# Patient Record
Sex: FEMALE
[De-identification: names, ages, dates, MRNs, and addresses within clinical notes are randomized per-mention and may not be internally consistent; named-entity substitution may affect disease eponyms.]

---
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
## 2021-12-13 ENCOUNTER — HOSPITAL ENCOUNTER (EMERGENCY)
Dept: HOSPITAL 56 - MW.ED | Age: 21
Discharge: HOME | End: 2021-12-13
Payer: COMMERCIAL

## 2021-12-13 VITALS — HEART RATE: 77 BPM | DIASTOLIC BLOOD PRESSURE: 60 MMHG | SYSTOLIC BLOOD PRESSURE: 142 MMHG

## 2021-12-13 DIAGNOSIS — E66.9: ICD-10-CM

## 2021-12-13 DIAGNOSIS — R07.89: Primary | ICD-10-CM

## 2021-12-13 LAB
BUN SERPL-MCNC: 15 MG/DL (ref 7–18)
CHLORIDE SERPL-SCNC: 103 MMOL/L (ref 98–107)
CO2 SERPL-SCNC: 26.9 MMOL/L (ref 21–32)
GLUCOSE SERPL-MCNC: 103 MG/DL (ref 74–106)
POTASSIUM SERPL-SCNC: 3.5 MMOL/L (ref 3.5–5.1)
SODIUM SERPL-SCNC: 139 MMOL/L (ref 136–145)

## 2021-12-13 NOTE — EDM.PDOC
ED [facility] GENERAL MEDICAL PROBLEM





- General


Chief Complaint: Chest Pain


Time Seen by Provider: 12/13/21 19:20


Source of Information: Reports: Patient


History Limitations: Reports: No Limitations





- History of Present Illness


INITIAL COMMENTS - FREE TEXT/NARRATIVE: 





21-year-old female history of PCOS presents with acute onset left-sided 

pleuritic chest pain at 5:00 today. Described as sharp, symptoms lasted 45 

minutes and is exacerbated with taking a deep breath, nonradiating, constant. 

She has IUD in place and admits to recent 16-hour drive to [place] on 12/3 and 

back on 12/7. She admits to palpitations. She denies fever, chills, cough, 

nausea, vomiting, history of DVT or PE, recent surgery.





ROS: A 10-point review of systems, other than pertinent positives and negatives 

as stated per HPI, is otherwise negative





Past medical history: No additional pertinent history


Past Surgical history: No additional pertinent history


Social history: No additional pertinent history


Family history: No additional pertinent history


________________________________________________________________________________





PHYSICAL EXAM





General: AOx4, GCS = 15, No distress


HEENT: dry mucous membrane


Neck: supple, no meningismus, no Kernig or Brudzinski


Cardiac: S1S2 RRR


Respiratory: CTAB, no crackles or rales, no wheezing


Abdomen: Soft, nontender, no rebound or guarding, nondistended, no pulsatile 

mass.


Back: nontender


Musculoskeletal: NVI distally, no deformity


Neuro: No focal deficits, CN 2 - 12 WNL.








  ** chest


Pain Score (Numeric/FACES): 1





- Related Data


                                    Allergies











Allergy/AdvReac Type Severity Reaction Status Date / Time


 


No Known Allergies Allergy   Verified 12/13/21 18:43











Home Meds: 


                                    Home Meds





. [No Known Home Meds]  12/13/21 [History]











Past Medical History


HEENT History: Reports: Otitis Media


Cardiovascular History: Reports: None


Respiratory History: Reports: None


Gastrointestinal History: Reports: None


Genitourinary History: Reports: None


OB/GYN History: Reports: Polycystic Ovaries


Musculoskeletal History: Reports: None


Neurological History: Reports: None


Psychiatric History: Reports: ADHD, Depression


Endocrine/Metabolic History: Reports: Obesity/BMI 30+, Other (See Below)


Other Endocrine/Metabolic History: prediabetic


Hematologic History: Reports: None


Immunologic History: Reports: None


Oncologic (Cancer) History: Reports: None


Dermatologic History: Reports: None





- Infectious Disease History


Infectious Disease History: Reports: Chicken Pox





- Past Surgical History


Head Surgeries/Procedures: Reports: None


HEENT Surgical History: Reports: Myringotomy w Tube(s)


Cardiovascular Surgical History: Reports: None


Respiratory Surgical History: Reports: None


GI Surgical History: Reports: None


Female  Surgical History: Reports: None


Endocrine Surgical History: Reports: None


Neurological Surgical History: Reports: None


Musculoskeletal Surgical History: Reports: None


Dermatological Surgical History: Reports: None





Social & Family History





- Family History


Family Medical History: No Pertinent Family History





- Tobacco Use


Tobacco Use Status *Q: Never Tobacco User





- Caffeine Use


Caffeine Use: Reports: Coffee, Energy Drinks





- Recreational Drug Use


Recreational Drug Use: Yes


Drug Use in Last 12 Months: Yes


Recreational Drug Type: Reports: Marijuana/Hashish


Recreational Drug Use Frequency: Daily





ED ROS GENERAL





- Review of Systems


Review Of Systems: See Below (see dictation)





ED EXAM, GENERAL





- Physical Exam


Exam: See Below (see dictation)


  ** #1 Interpretation


EKG Interpretation Comments: 





Heart rate = 81 bpm, normal sinus rhythm, normal QRS interval, no STEMI. EKG and

 rhythm strip interpreted by me at 1859





Course





- Vital Signs


Last Recorded V/S: 


                                Last Vital Signs











Temp  96.9 F   12/13/21 18:43


 


Pulse  83   12/13/21 18:43


 


Resp  18   12/13/21 18:43


 


BP  137/80   12/13/21 18:43


 


Pulse Ox  97   12/13/21 18:43














- Orders/Labs/Meds


Orders: 


                               Active Orders 24 hr











 Category Date Time Status


 


 Cardiac Monitoring [RC] .AS DIRECTED Care  12/13/21 20:51 Active


 


 Pulse Oximetry [RC] ASDIRECTED Care  12/13/21 20:51 Active


 


 Sodium Chloride 0.9% [Saline Flush] Med  12/13/21 20:51 Active





 10 ml FLUSH ASDIRECTED PRN   


 


 Sodium Chloride 0.9% [Saline Flush] Med  12/13/21 20:51 Active





 2.5 ml FLUSH ASDIRECTED PRN   


 


 Saline Lock Insert [OM.PC] Stat Oth  12/13/21 20:51 Ordered








                                Medication Orders





Sodium Chloride (Sodium Chloride 0.9% 10 Ml Syringe)  10 ml FLUSH ASDIRECTED PRN


   PRN Reason: Keep Vein Open


   Last Admin: 12/13/21 21:04  Dose: 10 ml


   Documented by: FELIPA


Sodium Chloride (Sodium Chloride 0.9% 2.5 Ml Syringe)  2.5 ml FLUSH ASDIRECTED 

PRN


   PRN Reason: Keep Vein Open


   Last Admin: 12/13/21 21:04  Dose: 2.5 ml


   Documented by: FELIPA








Labs: 


                                Laboratory Tests











  12/13/21 12/13/21 12/13/21 Range/Units





  21:07 21:07 21:07 


 


WBC  13.40 H    (4.0-11.0)  K/uL


 


RBC  4.64    (4.30-5.90)  M/uL


 


Hgb  13.2    (12.0-16.0)  g/dL


 


Hct  39.0    (36.0-46.0)  %


 


MCV  84.1    (80.0-98.0)  fL


 


MCH  28.4    (27.0-32.0)  pg


 


MCHC  33.8    (31.0-37.0)  g/dL


 


RDW Std Deviation  43.5    (28.0-62.0)  fl


 


RDW Coeff of Antonio  14    (11.0-15.0)  %


 


Plt Count  278    (150-400)  K/uL


 


MPV  9.60    (7.40-12.00)  fL


 


Neut % (Auto)  65.6    (48.0-80.0)  %


 


Lymph % (Auto)  26.5    (16.0-40.0)  %


 


Mono % (Auto)  6.6    (0.0-15.0)  %


 


Eos % (Auto)  1.2    (0.0-7.0)  %


 


Baso % (Auto)  0.1    (0.0-1.5)  %


 


Neut # (Auto)  8.8 H    (1.4-5.7)  K/uL


 


Lymph # (Auto)  3.6 H    (0.6-2.4)  K/uL


 


Mono # (Auto)  0.9 H    (0.0-0.8)  K/uL


 


Eos # (Auto)  0.2    (0.0-0.7)  K/uL


 


Baso # (Auto)  0.0    (0.0-0.1)  K/uL


 


Nucleated RBC %  0.0    /100WBC


 


Nucleated RBCs #  0    K/uL


 


INR    1.05  


 


D-Dimer, Quantitative    0.26  (0.0-0.50)  mg/L FEU


 


Sodium   139   (136-145)  mmol/L


 


Potassium   3.5   (3.5-5.1)  mmol/L


 


Chloride   103   ()  mmol/L


 


Carbon Dioxide   26.9   (21.0-32.0)  mmol/L


 


BUN   15   (7.0-18.0)  mg/dL


 


Creatinine   0.9   (0.6-1.0)  mg/dL


 


Est Cr Clr Drug Dosing   93.34   mL/min


 


Estimated GFR (MDRD)   > 60.0   ml/min


 


Glucose   103   ()  mg/dL


 


Calcium   8.9   (8.5-10.1)  mg/dL


 


Total Bilirubin   0.3   (0.2-1.0)  mg/dL


 


AST   19   (15-37)  IU/L


 


ALT   38   (14-63)  IU/L


 


Alkaline Phosphatase   117 H   ()  U/L


 


Troponin I   < 0.050   (0.000-0.056)  ng/mL


 


Total Protein   7.0   (6.4-8.2)  g/dL


 


Albumin   3.2 L   (3.4-5.0)  g/dL


 


Globulin   3.8   (2.6-4.0)  g/dL


 


Albumin/Globulin Ratio   0.8 L   (0.9-1.6)  


 


HCG, Qual     (NEG)  














  12/13/21 Range/Units





  21:07 


 


WBC   (4.0-11.0)  K/uL


 


RBC   (4.30-5.90)  M/uL


 


Hgb   (12.0-16.0)  g/dL


 


Hct   (36.0-46.0)  %


 


MCV   (80.0-98.0)  fL


 


MCH   (27.0-32.0)  pg


 


MCHC   (31.0-37.0)  g/dL


 


RDW Std Deviation   (28.0-62.0)  fl


 


RDW Coeff of Antoino   (11.0-15.0)  %


 


Plt Count   (150-400)  K/uL


 


MPV   (7.40-12.00)  fL


 


Neut % (Auto)   (48.0-80.0)  %


 


Lymph % (Auto)   (16.0-40.0)  %


 


Mono % (Auto)   (0.0-15.0)  %


 


Eos % (Auto)   (0.0-7.0)  %


 


Baso % (Auto)   (0.0-1.5)  %


 


Neut # (Auto)   (1.4-5.7)  K/uL


 


Lymph # (Auto)   (0.6-2.4)  K/uL


 


Mono # (Auto)   (0.0-0.8)  K/uL


 


Eos # (Auto)   (0.0-0.7)  K/uL


 


Baso # (Auto)   (0.0-0.1)  K/uL


 


Nucleated RBC %   /100WBC


 


Nucleated RBCs #   K/uL


 


INR   


 


D-Dimer, Quantitative   (0.0-0.50)  mg/L FEU


 


Sodium   (136-145)  mmol/L


 


Potassium   (3.5-5.1)  mmol/L


 


Chloride   ()  mmol/L


 


Carbon Dioxide   (21.0-32.0)  mmol/L


 


BUN   (7.0-18.0)  mg/dL


 


Creatinine   (0.6-1.0)  mg/dL


 


Est Cr Clr Drug Dosing   mL/min


 


Estimated GFR (MDRD)   ml/min


 


Glucose   ()  mg/dL


 


Calcium   (8.5-10.1)  mg/dL


 


Total Bilirubin   (0.2-1.0)  mg/dL


 


AST   (15-37)  IU/L


 


ALT   (14-63)  IU/L


 


Alkaline Phosphatase   ()  U/L


 


Troponin I   (0.000-0.056)  ng/mL


 


Total Protein   (6.4-8.2)  g/dL


 


Albumin   (3.4-5.0)  g/dL


 


Globulin   (2.6-4.0)  g/dL


 


Albumin/Globulin Ratio   (0.9-1.6)  


 


HCG, Qual  NEGATIVE  (NEG)  











Meds: 


Medications











Generic Name Dose Route Start Last Admin





  Trade Name Freq  PRN Reason Stop Dose Admin


 


Sodium Chloride  10 ml  12/13/21 20:51  12/13/21 21:04





  Sodium Chloride 0.9% 10 Ml Syringe  FLUSH   10 ml





  ASDIRECTED PRN   Administration





  Keep Vein Open  


 


Sodium Chloride  2.5 ml  12/13/21 20:51  12/13/21 21:04





  Sodium Chloride 0.9% 2.5 Ml Syringe  FLUSH   2.5 ml





  ASDIRECTED PRN   Administration





  Keep Vein Open  














- Re-Assessments/Exams


Free Text/Narrative Re-Assessment/Exam: 





12/13/21 22:06


After negative dimer and troponin in the ER, the patient is currently stable for

 discharge.  She is chest pain-free.  I performed a repeat exam and did not 

appreciate new abnormal findings. Patient exhibits normal vital signs and has a 

normal gait on road test. I advised the patient to return to the ER for 

reevaluation if symptoms worsened, including fever, worsening pain, or any other

 worrisome symptoms. I instructed the patient to follow up with cardiology for 

outpatient testing within 2-3 days.





________________________________________________________________________________





MEDICAL DECISION MAKING: This patient was evaluated during the COVID-19 pandemic

 where resources and capacity might be affected. I reviewed the patients past 

medical records, lab and radiographic findings. I discussed the case with the 

patient. My differential diagnosis included:ACS, pneumonia, PE, pneumothorax, 

chest wall pain, pulmonary edema/CHF, aortic dissection, pericarditis, intra-

abdominal process. 


Given the EKG and clinical history, I do not suspect pericarditis. There is no 

evidence of pneumothorax or infiltrate on CXR.  Aortic dissection was 

considered, however the presenting symptoms were uncharacteristic of aortic 

dissection. Chest X-ray shows no evidence of mediastinal widening and there are 

strong, equal and symmetric pulses. Given the current presentation, I do not 

suspect aortic dissection. The patients history, chest X-ray, and exam do not 

suggest pulmonary edema/congestive heart failure. Pulmonary embolism was 

considered but felt unlikely after negative D-dimer.  Intra-abdominal pathology 

felt unlikely given benign/non tender abdominal exam. Acute coronary syndrome 

was considered but there are negative troponin biomarker, no acute ischemic EKG 

changes, and the patient has a low HEART score less than 3. Based on this, I 

feel that there is low risk for short-term major adverse cardiac event. I have 

discussed this with the patient and reviewed options for inpatient and 

outpatient management. The patient verbalizes an excellent understanding of the 

above including presence of small risk of short-term major adverse cardiac event

 even in the setting of low HEART score, negative cardiac biomarker, and 

compendium of elements of this presentation. The patient wishes to pursue 

further workup on as an outpatient.





Departure





- Departure


Time of Disposition: 22:07


Disposition: Home, Self-Care 01


Condition: Good


Clinical Impression: 


 Atypical chest pain








- Discharge Information


*PRESCRIPTION DRUG MONITORING PROGRAM REVIEWED*: Not Applicable


*COPY OF PRESCRIPTION DRUG MONITORING REPORT IN PATIENT CHASIDY: Not Applicable


Instructions:  Nonspecific Chest Pain, Adult


Referrals: 


Romulo Fuller MD [Primary Care Provider] - 2 Days


Mickey Montelongo MD [Physician] - 3 Days


Forms:  ED Department Discharge


Additional Instructions: 


The need for follow-up, as well as the timing and circumstances, are variable 

depending upon the specifics of your emergency department visit.





If you don't have a primary care physician on staff, we will provide you with a 

referral. We always advise you to contact your personal physician following an 

emergency department visit to inform them of the circumstance of the visit and 

for follow-up with them and/or the need for any referrals to a consulting sp

ecialist.





The emergency department will also refer you to a specialist when appropriate. 

This referral assures that you have the opportunity for follow-up care with a 

specialist. All of these measure are taken in an effort to provide you with 

optimal care, which includes your follow-up.





Under all circumstances we always encourage you to contact your private 

physician who remains a resource for coordinating your care. When calling for 

follow-up care, please make the office aware that this follow-up is from your 

recent emergency room visit. If for any reason you are refused follow-up, please

 contact the [facility] Emergency 

Department at [phone] and asked to speak to the emergency department 

charge nurse.





If you do not have a primary care doctor, please follow up with the clinics 

below within 3-5 days. 





[facility]


[address] [place] [id number]


Phone: [phone]


Fax: [phone]





[address] [address]


Phone: [phone]


Fax: [phone]








Sepsis Event Note (ED)





- Evaluation


Sepsis Screening Result: No Definite Risk





- Focused Exam


Vital Signs: 


                                   Vital Signs











  Temp Pulse Resp BP Pulse Ox


 


 12/13/21 18:43  96.9 F  83  18  137/80  97














- My Orders


Last 24 Hours: 


My Active Orders





12/13/21 20:51


Cardiac Monitoring [RC] .AS DIRECTED 


Pulse Oximetry [RC] ASDIRECTED 


Sodium Chloride 0.9% [Saline Flush]   10 ml FLUSH ASDIRECTED PRN 


Sodium Chloride 0.9% [Saline Flush]   2.5 ml FLUSH ASDIRECTED PRN 


Saline Lock Insert [OM.PC] Stat 














- Assessment/Plan


Last 24 Hours: 


My Active Orders





12/13/21 20:51


Cardiac Monitoring [RC] .AS DIRECTED 


Pulse Oximetry [RC] ASDIRECTED 


Sodium Chloride 0.9% [Saline Flush]   10 ml FLUSH ASDIRECTED PRN 


Sodium Chloride 0.9% [Saline Flush]   2.5 ml FLUSH ASDIRECTED PRN 


Saline Lock Insert [OM.PC] Stat

## 2021-12-13 NOTE — CR
INDICATION:



Chest pain. 



COMPARISON:



None available. 



FINDINGS:



An erect single view of the chest was obtained at STUDY TIME hours. 



The lungs are clear. No focal or diffuse infiltrates are present.



The heart is normal in size. 



The mediastinum is normal in appearance. 



There is mild scoliosis of the thoracic spine convex towards the left.



IMPRESSION:



No active disease seen in the chest.



Dictated by Johny De La Cruz MD @ 12/13/2021 8:07:36 PM



(Electronically Signed)

## 2023-03-05 ENCOUNTER — HOSPITAL ENCOUNTER (EMERGENCY)
Dept: HOSPITAL 56 - MW.ED | Age: 23
Discharge: HOME | End: 2023-03-05
Payer: COMMERCIAL

## 2023-03-05 VITALS — HEART RATE: 93 BPM | SYSTOLIC BLOOD PRESSURE: 121 MMHG | DIASTOLIC BLOOD PRESSURE: 65 MMHG

## 2023-03-05 DIAGNOSIS — E66.9: ICD-10-CM

## 2023-03-05 DIAGNOSIS — A08.4: Primary | ICD-10-CM

## 2023-03-05 LAB
BUN SERPL-MCNC: 14 MG/DL (ref 7–18)
CHLORIDE SERPL-SCNC: 104 MMOL/L (ref 98–107)
CO2 SERPL-SCNC: 25.7 MMOL/L (ref 21–32)
EGFRCR SERPLBLD CKD-EPI 2021: 93 ML/MIN (ref 60–?)
GLUCOSE SERPL-MCNC: 110 MG/DL (ref 74–106)
POTASSIUM SERPL-SCNC: 4.2 MMOL/L (ref 3.5–5.1)
SODIUM SERPL-SCNC: 140 MMOL/L (ref 136–145)

## 2023-03-05 PROCEDURE — 83735 ASSAY OF MAGNESIUM: CPT

## 2023-03-05 PROCEDURE — 85025 COMPLETE CBC W/AUTO DIFF WBC: CPT

## 2023-03-05 PROCEDURE — 99284 EMERGENCY DEPT VISIT MOD MDM: CPT

## 2023-03-05 PROCEDURE — 96361 HYDRATE IV INFUSION ADD-ON: CPT

## 2023-03-05 PROCEDURE — 96374 THER/PROPH/DIAG INJ IV PUSH: CPT

## 2023-03-05 PROCEDURE — 84703 CHORIONIC GONADOTROPIN ASSAY: CPT

## 2023-03-05 PROCEDURE — 80053 COMPREHEN METABOLIC PANEL: CPT

## 2023-03-05 PROCEDURE — 36415 COLL VENOUS BLD VENIPUNCTURE: CPT

## 2023-09-24 ENCOUNTER — HOSPITAL ENCOUNTER (EMERGENCY)
Dept: HOSPITAL 56 - MW.ED | Age: 23
Discharge: HOME | End: 2023-09-24
Payer: SELF-PAY

## 2023-09-24 VITALS — SYSTOLIC BLOOD PRESSURE: 139 MMHG | DIASTOLIC BLOOD PRESSURE: 76 MMHG | HEART RATE: 87 BPM

## 2023-09-24 DIAGNOSIS — E66.9: ICD-10-CM

## 2023-09-24 DIAGNOSIS — G56.01: Primary | ICD-10-CM
